# Patient Record
(demographics unavailable — no encounter records)

---

## 2019-06-11 NOTE — RAD
XR Cerv Sp Ap   Lat STANDARD



History: Cervical stenosis. M 48.02



Comparison: None.



Findings: Moderate degenerative disc space height loss at C4-C6. The C7 vertebral body not well seen 
on lateral radiograph due to overlying shoulders.



There is anterolisthesis of C4 over C5 proximal to 2 mm in the neutral position no significant change
 in extension with increased to 4 mm with flexion.



Multiple missing teeth.



Impression: Advanced degenerative disease lower cervical spine with anterior translation with flexion
 of C4 over C5.



Reported By: Dioni Singh 

Electronically Signed:  6/11/2019 3:45 PM